# Patient Record
Sex: FEMALE | Race: WHITE
[De-identification: names, ages, dates, MRNs, and addresses within clinical notes are randomized per-mention and may not be internally consistent; named-entity substitution may affect disease eponyms.]

---
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
## 2020-06-29 ENCOUNTER — APPOINTMENT (OUTPATIENT)
Dept: OBGYN | Facility: CLINIC | Age: 20
End: 2020-06-29

## 2020-09-08 ENCOUNTER — FORM ENCOUNTER (OUTPATIENT)
Age: 20
End: 2020-09-08

## 2020-12-07 ENCOUNTER — FORM ENCOUNTER (OUTPATIENT)
Age: 20
End: 2020-12-07

## 2021-01-07 ENCOUNTER — APPOINTMENT (OUTPATIENT)
Dept: OBGYN | Facility: CLINIC | Age: 21
End: 2021-01-07

## 2021-12-16 ENCOUNTER — TRANSCRIPTION ENCOUNTER (OUTPATIENT)
Age: 21
End: 2021-12-16

## 2021-12-24 ENCOUNTER — TRANSCRIPTION ENCOUNTER (OUTPATIENT)
Age: 21
End: 2021-12-24

## 2022-04-20 DIAGNOSIS — N94.5 SECONDARY DYSMENORRHEA: ICD-10-CM

## 2022-04-20 DIAGNOSIS — Z86.19 PERSONAL HISTORY OF OTHER INFECTIOUS AND PARASITIC DISEASES: ICD-10-CM

## 2022-04-20 DIAGNOSIS — Z80.0 FAMILY HISTORY OF MALIGNANT NEOPLASM OF DIGESTIVE ORGANS: ICD-10-CM

## 2022-04-20 DIAGNOSIS — Z78.9 OTHER SPECIFIED HEALTH STATUS: ICD-10-CM

## 2022-05-23 ENCOUNTER — APPOINTMENT (OUTPATIENT)
Dept: OBGYN | Facility: CLINIC | Age: 22
End: 2022-05-23
Payer: COMMERCIAL

## 2022-05-23 VITALS
HEART RATE: 80 BPM | WEIGHT: 120 LBS | DIASTOLIC BLOOD PRESSURE: 76 MMHG | HEIGHT: 64 IN | SYSTOLIC BLOOD PRESSURE: 110 MMHG | BODY MASS INDEX: 20.49 KG/M2

## 2022-05-23 DIAGNOSIS — N76.0 ACUTE VAGINITIS: ICD-10-CM

## 2022-05-23 PROCEDURE — 99395 PREV VISIT EST AGE 18-39: CPT

## 2022-05-23 NOTE — HISTORY OF PRESENT ILLNESS
[TextBox_4] : 21yo G0 presents for routine gyn exam. 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  (Boyfriend is  in [place])  Declines STI screen.  Pt. c/o chronic yeast infections. Pt. does does not use condoms and uses rhythm/pull out. Declines contraceptives.\par \par Info. from prior EMR:\par Demographics: Race: White Ethnicity: Not  or  Native Language: English Occupation: Student [employer]\par : 0 Para: 0 0 0 0 \par OB History: No significant OB history.\par \par GYN History: No significant GYN history.\par GYN\par s/p Gardasil Cycle: monthly Duration: 5 Flow: normal Sexually Active Single\par \par Type of Contraception: CONDOMS, OCPs\par PMH\par No significant past medical history.\par Surgical History: No significant surgical history.\par Surgical History: NONE\par Allergies: PCN, Zithromax\par Current Medications: Prescribed/Suppliments/OTC Junel\par Medications Verified Medications Verified\par Family Hx\par Colon Cancer: MGM\par Personal history of blood clots/DVT/PE: no\par Personal history of conditions causing increased risk of blood clots/DVT/PE: no\par Family history of blood clots/DVT/PE: no\par Family history of conditions causing increased risk of blood clots/DVT/PE: no\par

## 2022-05-25 LAB
CANDIDA VAG CYTO: DETECTED
G VAGINALIS+PREV SP MTYP VAG QL MICRO: DETECTED
T VAGINALIS VAG QL WET PREP: NOT DETECTED

## 2022-05-25 RX ORDER — METRONIDAZOLE 7.5 MG/G
0.75 GEL VAGINAL
Qty: 1 | Refills: 1 | Status: ACTIVE | COMMUNITY
Start: 2022-05-25 | End: 1900-01-01

## 2022-05-25 RX ORDER — FLUCONAZOLE 150 MG/1
150 TABLET ORAL
Qty: 3 | Refills: 0 | Status: ACTIVE | COMMUNITY
Start: 2022-05-25 | End: 1900-01-01

## 2022-05-27 LAB — CYTOLOGY CVX/VAG DOC THIN PREP: ABNORMAL

## 2023-08-07 ENCOUNTER — APPOINTMENT (OUTPATIENT)
Dept: OBGYN | Facility: CLINIC | Age: 23
End: 2023-08-07
Payer: COMMERCIAL

## 2023-08-07 VITALS
RESPIRATION RATE: 17 BRPM | DIASTOLIC BLOOD PRESSURE: 60 MMHG | OXYGEN SATURATION: 100 % | BODY MASS INDEX: 20.49 KG/M2 | HEART RATE: 74 BPM | HEIGHT: 64 IN | SYSTOLIC BLOOD PRESSURE: 109 MMHG | WEIGHT: 120 LBS

## 2023-08-07 DIAGNOSIS — Z12.39 ENCOUNTER FOR OTHER SCREENING FOR MALIGNANT NEOPLASM OF BREAST: ICD-10-CM

## 2023-08-07 DIAGNOSIS — Z01.419 ENCOUNTER FOR GYNECOLOGICAL EXAMINATION (GENERAL) (ROUTINE) W/OUT ABNORMAL FINDINGS: ICD-10-CM

## 2023-08-07 PROCEDURE — 99395 PREV VISIT EST AGE 18-39: CPT

## 2023-08-07 RX ORDER — FLUCONAZOLE 150 MG/1
150 TABLET ORAL
Qty: 3 | Refills: 0 | Status: ACTIVE | COMMUNITY
Start: 2023-08-07 | End: 1900-01-01

## 2023-08-07 NOTE — HISTORY OF PRESENT ILLNESS
[FreeTextEntry1] : 22yo G0 presents for routine gyn exam. Same partner declines sti screen. C/O symptoms of a yeast infection. 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   <<<<<Last exam AV with PERRY 2022 23yo G0 presents for routine gyn exam. 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 (Boyfriend is  in [place]) Declines STI screen. Pt. c/o chronic yeast infections. Pt. does does not use condoms and uses rhythm/pull out. Declines contraceptives.  Info. from prior EMR: Demographics: Race: White Ethnicity: Not  or  Native Language: English Occupation: Student [employer] : 0 Para: 0 0 0 0 OB History: No significant OB history. GYN History: No significant GYN history. GYN s/p Gardasil Cycle: monthly Duration: 5 Flow: normal Sexually Active Single Type of Contraception: CONDOMS, OCPs PMH No significant past medical history. Surgical History: No significant surgical history. Surgical History: NONE Allergies: PCN, Zithromax Current Medications: Prescribed/Suppliments/OTC Junel Medications Verified Medications Verified Family Hx Colon Cancer: MGM Personal history of blood clots/DVT/PE: no Personal history of conditions causing increased risk of blood clots/DVT/PE: no Family history of blood clots/DVT/PE: no Family history of conditions causing increased risk of blood clots/DVT/PE: no

## 2023-08-07 NOTE — PHYSICAL EXAM
[Chaperone Declined] : Patient declined chaperone [Appropriately responsive] : appropriately responsive [Alert] : alert [No Acute Distress] : no acute distress [No Lymphadenopathy] : no lymphadenopathy [No Murmurs] : no murmurs [Soft] : soft [Non-tender] : non-tender [Non-distended] : non-distended [No Lesions] : no lesions [No Mass] : no mass [Oriented x3] : oriented x3 [Examination Of The Breasts] : a normal appearance [No Masses] : no breast masses were palpable [Labia Majora] : normal [Labia Minora] : normal [Normal] : normal [Uterine Adnexae] : normal [FreeTextEntry5] : Resp. rate wnl, color pink, no SOB [FreeTextEntry4] : + yeast

## 2023-08-13 LAB — CYTOLOGY CVX/VAG DOC THIN PREP: NORMAL

## 2024-11-08 ENCOUNTER — APPOINTMENT (OUTPATIENT)
Dept: OBGYN | Facility: CLINIC | Age: 24
End: 2024-11-08

## 2025-07-02 ENCOUNTER — NON-APPOINTMENT (OUTPATIENT)
Age: 25
End: 2025-07-02

## 2025-07-02 ENCOUNTER — APPOINTMENT (OUTPATIENT)
Dept: OBGYN | Facility: CLINIC | Age: 25
End: 2025-07-02

## 2025-07-02 VITALS
SYSTOLIC BLOOD PRESSURE: 103 MMHG | DIASTOLIC BLOOD PRESSURE: 64 MMHG | BODY MASS INDEX: 21.34 KG/M2 | WEIGHT: 125 LBS | HEIGHT: 64 IN

## 2025-07-02 PROCEDURE — 99395 PREV VISIT EST AGE 18-39: CPT

## 2025-07-02 PROCEDURE — 99213 OFFICE O/P EST LOW 20 MIN: CPT | Mod: 25

## 2025-07-02 RX ORDER — CLOTRIMAZOLE AND BETAMETHASONE DIPROPIONATE 10; .5 MG/G; MG/G
1-0.05 CREAM TOPICAL
Qty: 1 | Refills: 1 | Status: ACTIVE | COMMUNITY
Start: 2025-07-02 | End: 1900-01-01

## 2025-07-04 LAB
BV BACTERIA RRNA VAG QL NAA+PROBE: NOT DETECTED
C GLABRATA RNA VAG QL NAA+PROBE: NOT DETECTED
CANDIDA RRNA VAG QL PROBE: NOT DETECTED
T VAGINALIS RRNA SPEC QL NAA+PROBE: NOT DETECTED

## 2025-07-07 LAB — CYTOLOGY CVX/VAG DOC THIN PREP: NORMAL
